# Patient Record
Sex: FEMALE | Race: WHITE
[De-identification: names, ages, dates, MRNs, and addresses within clinical notes are randomized per-mention and may not be internally consistent; named-entity substitution may affect disease eponyms.]

---

## 2019-01-23 ENCOUNTER — HOSPITAL ENCOUNTER (OUTPATIENT)
Dept: HOSPITAL 47 - RADMAMWWP | Age: 54
Discharge: HOME | End: 2019-01-23
Attending: FAMILY MEDICINE
Payer: COMMERCIAL

## 2019-01-23 DIAGNOSIS — R92.8: ICD-10-CM

## 2019-01-23 DIAGNOSIS — Z12.31: Primary | ICD-10-CM

## 2019-01-23 PROCEDURE — 77067 SCR MAMMO BI INCL CAD: CPT

## 2019-01-23 PROCEDURE — 77063 BREAST TOMOSYNTHESIS BI: CPT

## 2019-01-26 NOTE — MM
Reason for exam: screening  (asymptomatic).

Baseline mammogram.



Physical Findings:

Nurse did not find any significant physical abnormalities on exam.



MG 3D Screening Mammo W/Cad

Bilateral CC and MLO view(s) were taken.

The breast tissue is heterogeneously dense. This may lower the sensitivity of 

mammography.

Nodularity lateral right breast may represent an intramammary lymph node or cyst. 

An ultrasound is recommended.  Otherwise, no discrete abnormality.





ASSESSMENT: Incomplete: need additional imaging evaluation, BI-RAD 0



RECOMMENDATION:

Ultrasound of the right breast.

## 2019-01-26 NOTE — USB
US Breast Workup Limited RT

Right limited breast ultrasound including focal area of concern, retroareolar and 

axilla demonstrates at the e7 o'clock position a 4 x 2 x 4 mm oval to small to 

characterize lesion.  Most likely a cyst.



These results were verbally communicated with the patient and result sheet given 

to the patient on 1/23/19.





ASSESSMENT: Probably benign, BI-RAD 3



RECOMMENDATION:

Follow-up diagnostic mammogram of the right breast in 6 months.

## 2020-10-20 ENCOUNTER — HOSPITAL ENCOUNTER (OUTPATIENT)
Dept: HOSPITAL 47 - RADMAMWWP | Age: 55
Discharge: HOME | End: 2020-10-20
Attending: FAMILY MEDICINE
Payer: COMMERCIAL

## 2020-10-20 DIAGNOSIS — R92.2: Primary | ICD-10-CM

## 2020-10-20 PROCEDURE — 77062 BREAST TOMOSYNTHESIS BI: CPT

## 2020-10-20 PROCEDURE — 77066 DX MAMMO INCL CAD BI: CPT

## 2020-10-26 NOTE — MM
Reason for exam: additional evaluation requested from prior study.

Last mammogram was performed 1 year and 9 months ago.



Physical Findings:

Nurse did not find any significant physical abnormalities on exam.



MG 3D Diag Mammo W/Cad SIMBA

Bilateral CC and MLO view(s) were taken.

Prior study comparison: January 23, 2019, bilateral MG 3d screening mammo w/cad.

The breast tissue is heterogeneously dense. This may lower the sensitivity of 

mammography.  Right lateral nodularity is stable. Routine follow up recommended.

No significant new findings when compared with previous films.



These results were verbally communicated with the patient and result sheet given 

to the patient on 10/20/20.





ASSESSMENT: Benign, BI-RAD 2



RECOMMENDATION:

Routine screening mammogram of both breasts in 1 year.

## 2023-07-19 ENCOUNTER — HOSPITAL ENCOUNTER (OUTPATIENT)
Dept: HOSPITAL 47 - RADMAMWWP | Age: 58
Discharge: HOME | End: 2023-07-19
Attending: FAMILY MEDICINE
Payer: COMMERCIAL

## 2023-07-19 DIAGNOSIS — Z12.31: Primary | ICD-10-CM

## 2023-07-19 PROCEDURE — 77063 BREAST TOMOSYNTHESIS BI: CPT

## 2023-07-19 PROCEDURE — 77067 SCR MAMMO BI INCL CAD: CPT

## 2023-07-20 NOTE — MM
Reason for Exam: Screening  (asymptomatic). 

Last mammogram was performed 2 year(s) and 9 month(s) ago. 





Patient History: 

Menarche at age 18. First Full-Term Pregnancy at age 31. 





Risk Values: 

Ximena 5 year model risk: 1.2%.

NCI Lifetime model risk: 8.3%.





Prior Study Comparison: 

1/23/2019 Bilateral Screening Mammogram, [facility]. 10/20/2020 Bilateral Diagnostic Mammogram, [facility]. 





Tissue Density: 

The breast tissue is heterogeneously dense. This may lower the sensitivity of mammography.





Findings: 

Analyzed By CAD. 

There is no suspicious group of microcalcifications or new suspicious mass in either breast. Benign

calcifications within both breasts. 





Overall Assessment: Benign, BI-RAD 2





Management: 

Screening Mammogram of both breasts in 1 year.

A clinical breast exam by your physician is recommended on an annual basis and results should be

correlated with mammographic findings.



Note on Ximena scores and lifetime risk:

1. A Ximena score greater than 3% is considered moderate risk. If this is the case, consider

specialist referral to assess eligibility for a risk reducing agent.

If overall lifetime risk for the development of breast cancer is 20% or higher, the patient may

qualify for future screening with alternating mammogram and breast MRI.



Electronically signed and approved by: Feliciano Meyer D.O.